# Patient Record
Sex: MALE | Race: WHITE | NOT HISPANIC OR LATINO | Employment: FULL TIME
[De-identification: names, ages, dates, MRNs, and addresses within clinical notes are randomized per-mention and may not be internally consistent; named-entity substitution may affect disease eponyms.]

---
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
## 2023-01-25 ENCOUNTER — ANCILLARY PROCEDURE (OUTPATIENT)
Dept: GENERAL RADIOLOGY | Facility: CLINIC | Age: 23
End: 2023-01-25
Attending: PODIATRIST
Payer: COMMERCIAL

## 2023-01-25 ENCOUNTER — OFFICE VISIT (OUTPATIENT)
Dept: PODIATRY | Facility: CLINIC | Age: 23
End: 2023-01-25
Payer: COMMERCIAL

## 2023-01-25 VITALS
SYSTOLIC BLOOD PRESSURE: 143 MMHG | HEIGHT: 75 IN | OXYGEN SATURATION: 100 % | DIASTOLIC BLOOD PRESSURE: 83 MMHG | HEART RATE: 87 BPM

## 2023-01-25 DIAGNOSIS — M20.5X1 HALLUX LIMITUS, RIGHT: ICD-10-CM

## 2023-01-25 DIAGNOSIS — M79.671 RIGHT FOOT PAIN: ICD-10-CM

## 2023-01-25 DIAGNOSIS — S93.521A SPRAIN OF METATARSOPHALANGEAL JOINT OF GREAT TOE, RIGHT, INITIAL ENCOUNTER: Primary | ICD-10-CM

## 2023-01-25 PROCEDURE — 99204 OFFICE O/P NEW MOD 45 MIN: CPT | Performed by: PODIATRIST

## 2023-01-25 PROCEDURE — 73630 X-RAY EXAM OF FOOT: CPT | Mod: TC | Performed by: RADIOLOGY

## 2023-01-25 NOTE — PATIENT INSTRUCTIONS
PATIENT INSTRUCTIONS - Podiatry / Foot & Ankle Surgery      Diclofenac / Voltaren - 1 pill twice daily x1 week.  Then take a 1 week break.  Repeat as needed.  Take with food & an acid blocker if stomach upset occurs.  Stop all other NSAIDs (aspirin, ibuprofen/Motrin, naproxen/ Aleve).      Spica taping 1st MTPJ downwards    Or firm soled shoe to forefoot  or surgical shoe - Size 13 men's       [employer] extension - [employer]      Note for modified duty to pain tolerance - next 6 weeks      I can inject cortisone if not improved

## 2023-01-25 NOTE — LETTER
1/25/2023         RE: Romario Hall III  [address]  [place] [address]        Dear Colleague,    Thank you for referring your patient, Romario Hall III, to the [facility]. Please see a copy of my visit note below.    Assessment:      ICD-10-CM    1. Sprain of metatarsophalangeal joint of great toe, right, initial encounter  S93.521A diclofenac (VOLTAREN) 50 MG EC tablet     Ankle/Foot Bracing Supplies DME Post-op Shoe; Right      2. Hallux limitus, right  M20.5X1 Ankle/Foot Bracing Supplies DME Post-op Shoe; Right      3. Right foot pain  M79.671 XR Foot Right G/E 3 Views     diclofenac (VOLTAREN) 50 MG EC tablet           Plan:  Orders Placed This Encounter   Procedures     XR Foot Right G/E 3 Views     Ankle/Foot Bracing Supplies DME Post-op Shoe; Right       Discussed the etiology and treatment of the condition with the patient.  Imaging studies reviewed and discussed with the patient.  Discussed surgical and conservative options.    Sprain 1st MTPJ R  Hallux limitus    -Injection- cortisone if needed  -NSAID- Rx po  -Immobilization-  post-op shoe, Spica taping  -Activity- note for work  -WB- full        Return:  No follow-ups on file.    Bing Escobedo DPM                Chief Complaint:     Patient presents with:  Right Great Toe - Pain     right foot pain    HPI:  Romario Hall III is a 22 year old year old male who presents for evaluation of foot pain.    Pain location- right big toe  Duration- 1/23/2023  MADONNA: hyperextended his toe when he jammed it   Pain with WB on the right foot     Works on his feet all day at [employer]      Past Medical & Surgical History:  No past medical history on file.   No past surgical history on file.   No family history on file.     Social History:  ?  History   Smoking Status     Not on file   Smokeless Tobacco     Not on file     History   Drug Use Not on file     Social History    Substance and Sexual Activity      Alcohol use: Not on  "file      Allergies:  ?   No Known Allergies     Medications:    Current Outpatient Medications   Medication     diclofenac (VOLTAREN) 50 MG EC tablet     No current facility-administered medications for this visit.       Physical Exam:  ?  Vitals:  BP (!) 143/83   Pulse 87   Ht 1.905 m (6' 3\")   SpO2 100%    General:  WD/WN, in NAD.  A&O x3.  Dermatologic:    Skin is intact, open lesions absent.   Skin texture, turgor is normal.  Vascular:  Pulses palpable right.  Digital capillary refill time normal right.  Skin temperature is normal right.  Generalized edema- none bilateral.  Focal edema- mild 1st MTP right.  Neurologic:    Gross sensation normal.  Gait and balance abnormal, antalgic, R.  Musculoskeletal:  Maximal pain to palpation of dorsal 1st MTP right.  no pain to palpation of sesamoids right.  1st MTPJ ROM limited, painful  Right vs L    Muscle strength 5/5  foot and ankle bilateral.    Stance:  RCSP Valgus bilateral.  Clinical deformity: hallux limitus    Imaging:   x-ray independently reviewed and interpreted by myself today.  Weight-bearing views right foot dated 01/25/23, reveal no fracture or visible OCDs, hallux limitus.  X-ray is not full WB.                    Again, thank you for allowing me to participate in the care of your patient.        Sincerely,        Bing Escobedo DPM    "

## 2023-01-25 NOTE — LETTER
January 25, 2023      Romario Hall III  [address]   [place] [address]        To Whom It May Concern,     Romario Hall III attended clinic here on Jan 25, 2023 for evaluation of his right foot. Please allow him to return to work with the following restrictions:     Modified duty to pain tolerance for 6 weeks    If you have questions or concerns, please call the clinic at the number listed above.    Sincerely,         Bing Escobedo DPM    (Electronically signed)

## 2023-01-25 NOTE — PROGRESS NOTES
Assessment:      ICD-10-CM    1. Sprain of metatarsophalangeal joint of great toe, right, initial encounter  S93.521A diclofenac (VOLTAREN) 50 MG EC tablet     Ankle/Foot Bracing Supplies DME Post-op Shoe; Right      2. Hallux limitus, right  M20.5X1 Ankle/Foot Bracing Supplies DME Post-op Shoe; Right      3. Right foot pain  M79.671 XR Foot Right G/E 3 Views     diclofenac (VOLTAREN) 50 MG EC tablet           Plan:  Orders Placed This Encounter   Procedures     XR Foot Right G/E 3 Views     Ankle/Foot Bracing Supplies DME Post-op Shoe; Right       Discussed the etiology and treatment of the condition with the patient.  Imaging studies reviewed and discussed with the patient.  Discussed surgical and conservative options.    Sprain 1st MTPJ R  Hallux limitus    -Injection- cortisone if needed  -NSAID- Rx po  -Immobilization-  post-op shoe, Spica taping  -Activity- note for work  -WB- full        Return:  No follow-ups on file.    Bing Escobedo DPM                Chief Complaint:     Patient presents with:  Right Great Toe - Pain     right foot pain    HPI:  Romario Hall III is a 22 year old year old male who presents for evaluation of foot pain.    Pain location- right big toe  Duration- 1/23/2023  MADONNA: hyperextended his toe when he jammed it   Pain with WB on the right foot     Works on his feet all day at [employer]      Past Medical & Surgical History:  No past medical history on file.   No past surgical history on file.   No family history on file.     Social History:  ?  History   Smoking Status     Not on file   Smokeless Tobacco     Not on file     History   Drug Use Not on file     Social History    Substance and Sexual Activity      Alcohol use: Not on file      Allergies:  ?   No Known Allergies     Medications:    Current Outpatient Medications   Medication     diclofenac (VOLTAREN) 50 MG EC tablet     No current facility-administered medications for this visit.       Physical Exam:  ?  Vitals:  BP (!)  "143/83   Pulse 87   Ht 1.905 m (6' 3\")   SpO2 100%    General:  WD/WN, in NAD.  A&O x3.  Dermatologic:    Skin is intact, open lesions absent.   Skin texture, turgor is normal.  Vascular:  Pulses palpable right.  Digital capillary refill time normal right.  Skin temperature is normal right.  Generalized edema- none bilateral.  Focal edema- mild 1st MTP right.  Neurologic:    Gross sensation normal.  Gait and balance abnormal, antalgic, R.  Musculoskeletal:  Maximal pain to palpation of dorsal 1st MTP right.  no pain to palpation of sesamoids right.  1st MTPJ ROM limited, painful  Right vs L    Muscle strength 5/5  foot and ankle bilateral.    Stance:  RCSP Valgus bilateral.  Clinical deformity: hallux limitus    Imaging:   x-ray independently reviewed and interpreted by myself today.  Weight-bearing views right foot dated 01/25/23, reveal no fracture or visible OCDs, hallux limitus.  X-ray is not full WB.                "

## 2023-02-22 ENCOUNTER — OFFICE VISIT (OUTPATIENT)
Dept: FAMILY MEDICINE | Facility: CLINIC | Age: 23
End: 2023-02-22
Payer: COMMERCIAL

## 2023-02-22 ENCOUNTER — TELEPHONE (OUTPATIENT)
Dept: FAMILY MEDICINE | Facility: CLINIC | Age: 23
End: 2023-02-22

## 2023-02-22 VITALS
SYSTOLIC BLOOD PRESSURE: 130 MMHG | RESPIRATION RATE: 18 BRPM | BODY MASS INDEX: 31.46 KG/M2 | DIASTOLIC BLOOD PRESSURE: 84 MMHG | WEIGHT: 253 LBS | HEART RATE: 100 BPM | TEMPERATURE: 99.4 F | OXYGEN SATURATION: 97 % | HEIGHT: 75 IN

## 2023-02-22 DIAGNOSIS — H66.005 RECURRENT ACUTE SUPPURATIVE OTITIS MEDIA WITHOUT SPONTANEOUS RUPTURE OF LEFT TYMPANIC MEMBRANE: Primary | ICD-10-CM

## 2023-02-22 PROCEDURE — 99203 OFFICE O/P NEW LOW 30 MIN: CPT | Performed by: FAMILY MEDICINE

## 2023-02-22 RX ORDER — AMOXICILLIN 875 MG
875 TABLET ORAL 2 TIMES DAILY
Qty: 14 TABLET | Refills: 0 | Status: SHIPPED | OUTPATIENT
Start: 2023-02-22

## 2023-02-22 NOTE — TELEPHONE ENCOUNTER
Reason for call:  Symptom and pain with his ear    Patient called regarding (reason for call): Patient is calling in he was seen by Dr. Chavez and he would like to talk to him about a few things. He has a few questions.     Additional comments: He was laying down on the ear he is having issues with and he heard crackling and the pain got worse.    He wants to know what he should do next.     Phone number to reach patient:  Cell number on file:    Telephone Information:   Mobile [phone]       Best Time:      Can we leave a detailed message on this number?  YES    Travel screening: Not Applicable

## 2023-02-22 NOTE — TELEPHONE ENCOUNTER
Patient called in to clinic, stating that he was seen previously today for recurrent ear infection and has called into clinic, notes that when he arrived home he laid down and experienced new symptoms.    After laying down, patient began to hear increased crackling in his ear with increased pain. Patient now notes that there is a small to moderate amount of thin/watery drainage, was clear and is now yellow, no blood noted. Still having increased pain. Had increased temp this morning at 99.4, has not checked since then. Patient concerned that he has ruptured his eardrum.    States that he will complete regimen as advised at appointment today, but is asking if he needs any further follow-up now that drainage has started. Writer did advise patient to call back/proceed to UC if symptoms worsen, patient stated understanding.    Routing to PCP to advise.    Thanks,  LAVONNE Herman RN  [facility]

## 2023-02-22 NOTE — PROGRESS NOTES
"  Assessment & Plan   Problem List Items Addressed This Visit    None  Visit Diagnoses     Recurrent acute suppurative otitis media without spontaneous rupture of left tympanic membrane    -  Primary    Relevant Medications    amoxicillin (AMOXIL) 875 MG tablet       Recurrent, so   Flonase  NeilMed Sinus Rinse  Afrin (oxymetazoline)   Amoxicillin             BMI:   Estimated body mass index is 31.62 kg/m  as calculated from the following:    Height as of this encounter: 1.905 m (6' 3\").    Weight as of this encounter: 114.8 kg (253 lb).           No follow-ups on file.    TRACY ELLSWORTH MD  [facility] KAITLIN Doss is a 22 year old, presenting for the following health issues:  Ear Problem (Left ear pain)      History of Present Illness       Reason for visit:  Ear infection concerns  Symptom onset:  Today    He eats 0-1 servings of fruits and vegetables daily.He consumes 3 sweetened beverage(s) daily.He exercises with enough effort to increase his heart rate 30 to 60 minutes per day.  He exercises with enough effort to increase his heart rate 4 days per week.   He is taking medications regularly.             Review of Systems   Constitutional, HEENT, cardiovascular, pulmonary, gi and gu systems are negative, except as otherwise noted.      Objective    /84 (BP Location: Right arm, Patient Position: Chair, Cuff Size: Adult Large)   Pulse 100   Temp 99.4  F (37.4  C) (Oral)   Resp 18   Ht 1.905 m (6' 3\")   Wt 114.8 kg (253 lb)   SpO2 97%   BMI 31.62 kg/m    Body mass index is 31.62 kg/m .  Physical Exam   GENERAL: healthy, alert and no distress  HENT: Right tympanic membrane and ear canal are normal, left ear canal is normal but tympanic membrane is slightly erythematous with fluid and no movement with Valsalva  NECK: no adenopathy, no asymmetry, masses, or scars and thyroid normal to palpation                      "

## 2023-02-22 NOTE — TELEPHONE ENCOUNTER
Patient calling regarding ear symptom. Patient was seen in office today regarding left ear pain. Patient had laid down on left ear, heard crackling, and increase in pain.     Patient concerned he may have ruptured his eardrum, seeking further advice from provider on next steps.    Writer advised message will be sent to provider, otherwise to continue suggested regimen from visit earlier today. Advised patient should call back if symptoms worsen in the meantime or go to UC. Patient verbalized understanding and has no further questions at this time.     JUSTIN MorenoN RN  [facility]

## 2023-02-23 NOTE — TELEPHONE ENCOUNTER
"Called patient, left detailed message below and to call back at [phone] with any questions.    \"Pt to take the 875mg amoxicillin twice daily, call on Monday if no improvement. Dr Scott Reza\"    JUSTIN HermanN RN  [facility]  "

## 2023-04-23 ENCOUNTER — HEALTH MAINTENANCE LETTER (OUTPATIENT)
Age: 23
End: 2023-04-23

## 2023-05-25 ENCOUNTER — TRANSFERRED RECORDS (OUTPATIENT)
Dept: HEALTH INFORMATION MANAGEMENT | Facility: CLINIC | Age: 23
End: 2023-05-25
Payer: COMMERCIAL

## 2023-06-22 ENCOUNTER — TRANSFERRED RECORDS (OUTPATIENT)
Dept: HEALTH INFORMATION MANAGEMENT | Facility: CLINIC | Age: 23
End: 2023-06-22
Payer: COMMERCIAL

## 2024-06-30 ENCOUNTER — HEALTH MAINTENANCE LETTER (OUTPATIENT)
Age: 24
End: 2024-06-30

## 2025-07-13 ENCOUNTER — HEALTH MAINTENANCE LETTER (OUTPATIENT)
Age: 25
End: 2025-07-13